# Patient Record
Sex: MALE
[De-identification: names, ages, dates, MRNs, and addresses within clinical notes are randomized per-mention and may not be internally consistent; named-entity substitution may affect disease eponyms.]

---

## 2019-01-20 ENCOUNTER — HOSPITAL ENCOUNTER (EMERGENCY)
Dept: HOSPITAL 42 - ED | Age: 34
Discharge: HOME | End: 2019-01-20
Payer: COMMERCIAL

## 2019-01-20 VITALS — RESPIRATION RATE: 18 BRPM | TEMPERATURE: 98.1 F

## 2019-01-20 VITALS — SYSTOLIC BLOOD PRESSURE: 120 MMHG | OXYGEN SATURATION: 96 % | HEART RATE: 80 BPM | DIASTOLIC BLOOD PRESSURE: 82 MMHG

## 2019-01-20 VITALS — BODY MASS INDEX: 24.3 KG/M2

## 2019-01-20 DIAGNOSIS — M79.631: ICD-10-CM
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
## 2019-01-20 DIAGNOSIS — R07.81: Primary | ICD-10-CM
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
